# Patient Record
Sex: FEMALE
[De-identification: names, ages, dates, MRNs, and addresses within clinical notes are randomized per-mention and may not be internally consistent; named-entity substitution may affect disease eponyms.]

---

## 2018-04-27 ENCOUNTER — HOSPITAL ENCOUNTER (EMERGENCY)
Dept: HOSPITAL 5 - ED | Age: 23
Discharge: LEFT BEFORE DISCHARGE | End: 2018-04-27
Payer: SELF-PAY

## 2018-04-27 VITALS — SYSTOLIC BLOOD PRESSURE: 118 MMHG | DIASTOLIC BLOOD PRESSURE: 72 MMHG

## 2018-04-27 DIAGNOSIS — Z53.21: ICD-10-CM

## 2018-04-27 DIAGNOSIS — M79.641: Primary | ICD-10-CM

## 2018-04-27 NOTE — XRAY REPORT
FINAL REPORT



EXAM:  XR HAND 3+V RT



HISTORY:  Right hand swelling and pain post trauma/punched wall 



TECHNIQUE:  Three views of the right hand were obtained.



FINDINGS:  

There is no evidence of fracture or dislocation. There is very

mild soft tissue swelling overlying the distal end of the 5th

metacarpal dorsally. The wrist joint appears intact. 



IMPRESSION:  

Very mild soft swelling overlying the dorsal aspect of the distal

end of the 5th metacarpal.



No evidence of fracture or dislocation.